# Patient Record
(demographics unavailable — no encounter records)

---

## 2017-01-08 NOTE — PICIS
[facility]

                                EMERGENCY RECORD



TRIAGE (Sun 2017 10:33 AWAT)

TRIAGE NOTES:  SORE THROAT, RUNNY NOSE, AND COUGH X 3

      DAYS..... OH, AND WHEN ALMOST FINISHED WITH TRIAGE, SHE SAID SHE HAS

      ALSO HAD A HEADACHE... (Sun 2017 10:33 AWAT)

PATIENT: NAME: Latisha Acevedo, AGE: 38, GENDER: female, :

      , TIME OF GREET: Sun 2017 10:15, PREFERRED

      LANGUAGE: English, ETHNICITY: Not  or , FALL RISK: NO,

      ECODE BILLING MAP: [facility], SSN: [id number], Zip Code:

      [address], KG WEIGHT: 83.91, PHONE: [phone], MEDICAL RECORD NUMBER:

      [id number], ACCOUNT NUMBER: [id number], PERSON ID: [id number], PCP:

      NONE. (Sun 2017 10:33 AWAT)

COMPLAINT:  SORE THROAT,RUNNY NOSE,COUGH. (Sun 2017 10:33

      AWAT)

ADMISSION: URGENCY: 5 Fast Track, ADMISSION SOURCE: Home,

      TRANSPORT: Walk-in, BED: TRIAGE. (Sun 2017 10:33 AWAT)

PAIN: Location HEAD- DULL HEADACHE. (10:48 AWAT)

SIRS SCORING: Heart Rate  (0), Temp range 96.8-101.1 (0),

      respiratory rate 12-24 (0), Mental Status altered: no (0). (10:48

      AWAT)

PROVIDERS: TRIAGE NURSE: Joshua Wallace RN. (Sun 2017 10:33

      AWAT)

VITAL SIGNS: /95, Pulse 95, Resp 18, Temp 98.8, (Tympanic),

      Pain 5, O2 Sat 96, on Room Air, Time 2017 10:29. (10:29 AWAT)

PREVIOUS VISIT ALLERGIES: HYDROcodone bitartrate (bulk), Sulfa

      (Sulfonamide Antibiotics), traMADol. (Sun 2017 10:33 AWAT)

  HYDROcodone bitartrate (bulk), Sulfa (Sulfonamide Antibiotics), traMADol.

      (10:48 AWAT)



KNOWN ALLERGIES

HYDROcodone bitartrate (bulk): - CAUSES ANXIETY & RAPID HEARTRATE

No Known Drug Allergies (Unconfirmed)

Sulfa (Sulfonamide Antibiotics): Reaction: Rash

traMADol: - CAUSES ANXIETY & RAPIT HEARTRATE



CURRENT MEDICATIONS (10:35 AWAT)

clonazePAM: 

      TABLET : Strength - 0.5 mg : ORAL

      Patient Dose: 0.5 tab(s) Oral 3 times a day.

Proventil HFA: 

      HFA AEROSOL WITH ADAPTER (GRAM) : Strength - 90 mcg : INHALATION

      Patient Dose: 1 puff(s) INHALATION every 4 hours prn.



VITAL SIGNS (10:29 AWAT)

VITAL SIGNS: BP: 132/95, Pulse: 95, Resp: 18, Temp: 98.8

      (Tympanic), Pain: 5, O2 sat: 96 on Room Air, Time: 2017 10:29.



NURSING ASSESSMENT: RESPIRATORY /CHEST (11:01 AWAT)

CONSTITUTIONAL: Simple assessment performed, Patient arrives

      ambulatory, Gait steady, History obtained from patient, Patient

      appears comfortable, Patient cooperative, Patient alert, Oriented to



&a-1R&a+25V*p+0X*z8580U*c202B*c15G*c2P*p-0X&a-25V&a+1R         Name: Latisha Acevedo  : 1978 F38 MedRec: [id number]

                             AcctNum: [id number]

  Prepared: Sun 2017 11:42 by Interface                 Page 1 of 7

                                      pMD

                        [facility]

                                EMERGENCY RECORD



      person, place and time, Skin warm, Skin dry, Skin normal in color,

      Mucous membranes pink, Mucous membranes moist, Patient is

      well-groomed, Patient complains of SORE THROAT, RUNNY NOSE, COUGH,

      DULL HEADACHE.

PAIN: on a scale 0-10 patient rates pain as 5,

      HEADACHE.

RESPIRATORY/CHEST: Breath sounds clear, Respiratory assessment

      findings include respiratory effort easy, Respirations regular,

      Conversing normally, Neck and chest exam findings include trachea

      midline, Chest expansion equal, Chest movement symmetrical,

      Associated with cough, dry, AS REPORTED BY

      PT. NONE WITNESSED DURING THIS VISIT.

ENT: Ear assessment findings include ear normal to inspection,

      Nasal assessment findings include nose normal to inspection, Mouth

      and throat assessment findings include mouth inspection normal.

NOTES: Emotional support needed and given, Patient tolerated

      procedure well.

SAFETY: Side rails up, Cart/Stretcher in lowest position, Family

      at bedside, Call light within reach, Hospital ID band on, Physician

      notified of above findings.



NURSING PROCEDURE: DISCHARGE NOTE (11:25 AWAT)

DISCHARGE: Patient discharged to home, ambulating without

      assistance, driving self, accompanied by /wife/partner,

      Summary of Care printed/ provided, Patient requested and was provided

      an electronic copy of Discharge Instructions, Transition record given

      to patient, Discharge instructions given to patient, Simple or

      moderate discharge teaching performed, by JUWAN RN, Prescriptions

      given and instructions on side effects given, Name of prescription(s)

      given: AMOXIL, PHENERGAN DM, ALBUTEROL INHALER, Above person(s)

      verbalized understanding of discharge instructions and follow-up

      care, Patient discharged by, JUWAN RN, Patient treated and

      evaluated by physician.

BELONGINGS: Belongings remain with patient, Valuables remain with

      patient.

NOTES: Patient tolerated procedure well.

SAFETY: Side rails up, Cart/Stretcher in lowest position, Family

      at bedside, Call light within reach, Hospital ID band on, Physician

      notified of above findings.

VITAL SIGNS: Time: 1125.



MEDICATION ADMINISTRATION SUMMARY



      Drug Name: Tessalon Perles, Dose Ordered: 200 mg, Route: Oral,

      Status: Given, Time: 11:20 2017,

      Drug Name: amoxicillin, Dose Ordered: 500 mg, Route: Oral, Status:

      Given, Time: 11:19 2017, Detailed record available in Medication

      Service section.



MEDICATION SERVICE



&a-1R&a+25V*p+0X*[id number]*c202B*c15G*c2P*p-0X&a-25V&a+1R         Name: Latisha Acevedo  : 1978 F38 MedRec: [id number]

                             AcctNum: [id number]

  Prepared: Sun 2017 11:42 by Interface                 Page 2 of 7

                                      pMD

                        [facility]

                                EMERGENCY RECORD



amoxicillin:  Order: amoxicillin (amoxicillin trihydrate) -

      Dose: 500 mg : Oral

      Schedule: Now

      Ordered by: Ac Chavez MD

      Entered by: MD Adrianne Wyatt 2017 11:07 ,

      Acknowledged by: DREW Alcantar 2017 11:18

      Documented as given by: DREW Alcantar 2017 11:19

      Patient, Medication, Dose, Route and Time verified prior to

      administration.

       Amount given: 500MG, Site: Medication administered P.O., Correct

      patient, time, route, dose and medication confirmed prior to

      administration, Patient advised of actions and side-effects prior to

      administration, Allergies confirmed and medications reviewed prior to

      administration, Administered by JUWAN RN, Patient in position of

      comfort, Side rails up, Cart in lowest position, Family at bedside.

Tessalon Perles:  Order: Tessalon Perles (benzonatate) -

      Dose: 200 mg : Oral

      Schedule: Now

      Ordered by: Ac Chavez MD

      Entered by: MD Adrianne Wyatt 2017 11:07 ,

      Acknowledged by: Joshua Wallace RN Sun 2017 11:18

      Documented as given by: Joshua Wallace RN Sun 2017 11:20

      Patient, Medication, Dose, Route and Time verified prior to

      administration.

       Amount given: 200MG, Site: Medication administered P.O., Correct

      patient, time, route, dose and medication confirmed prior to

      administration, Patient advised of actions and side-effects prior to

      administration, Allergies confirmed and medications reviewed prior to

      administration, Administered by JUWAN RUSSELL, Patient in position of

      comfort, Side rails up, Cart in lowest position, Family at bedside.



HPI COUGH (11:21 LLDO)

CHIEF COMPLAINT: Patient presents for evaluation of

      cough, productive of yellow sputum, Denies barking

      cough, Patient presents for evaluation of see triage note.

      HISTORIAN: History provided by patient, History provided by

      patient's spouse. LOCATION: Symptoms are

      generalized. QUALITY: Symptoms described as

      tightness, Pain is dull in nature, described as

      aching, Described as similar to previous episodes, pt is a

      smoker and has had to use inhalers in the past. out of meds at this

      time. SEVERITY: Maximum severity of symptoms

      moderate, Currently symptoms are moderate. TIME

      COURSE: Sudden onset of symptoms, Symptoms are

      worsening, are constant. ASSOCIATED WITH:

      Associated symptoms reviewed, Associated with fever,

      subjective, Associated with hyperventilation,

      Associated with upper respiratory infection,

      Associated with wheezing, Associated with

      weakness. EXACERBATED BY: Patient's condition



&a-1R&a+25V*p+0X*t7514F*c202B*c15G*c2P*p-0X&a-25V&a+1R         Name: Latisha Acevedo  : 1978 F38 MedRec: [id number]

                             AcctNum: [id number]

  Prepared: Sun 2017 11:42 by Interface                 Page 3 of 7

                                      pMD

                        [facility]

                                EMERGENCY RECORD



      exacerbated by deep breaths, Patient's condition

      exacerbated by exercise, Patient's condition exacerbated by

      lying flat. RELIEVED BY: Patient's condition relieved

      by rest, Patient's condition relieved by upright position.



ROS

CONSTITUTIONAL:  couldn't sleep last night d.t. cough and

      congestion. (11:23 LLDO)

EYES: Negative eye review of systems, Historian denies eye pain,

      denies eye redness, denies eye discharge. (11:27 LLDO)

ENT: Historian reports sinus pain, reports sore

      throat. (11:23 LLDO)

CARDIOVASCULAR: Historian reports dyspnea on exertion.

      (11:23 LLDO)

RESPIRATORY: Historian reports cough, reports

      sputum. described as thick, green,

      yellow, Historian denies stridor, reports

      wheezing. (11:23 LLDO)

GI: Negative gastrointestinal review of systems, Historian denies

      abdominal pain, denies constipation, denies diarrhea, denies nausea,

      denies vomiting. (11:27 LLDO)

GENITOURINARY FEMALE: Negative genitourinary review of systems,

      Historian denies dysuria, denies frequency, denies urgency. (11:27

      LLDO)

MUSCULOSKELETAL: Negative musculoskeletal review of systems,

      Historian denies arthralgias, denies back pain, denies injury, denies

      myalgias, denies neck pain. (11:27 LLDO)

SKIN: Negative skin review of systems, Historian denies

      cellulitis, denies rash, denies skin changes, denies skin lesions.

      (11:27 LLDO)

NEUROLOGIC: Historian denies confusion, denies dizziness, denies

      dysphasia, denies focal weakness, denies gait changes, reports

      headache, denies irritability, denies lethargy, denies mental

      status changes. (11:23 LLDO)

HEMO/LYMPHATIC: Normal hematologic/lymphatic system review,

      Historian denies abnormal blood clotting, denies gum bleeding, denies

      petechiae. (11:27 LLDO)

ALLERGIC/IMMUNOLOGIC: Normal allergy/immunologic system review,

      Historian denies eczema, denies environmental allergies, denies food

      allergies. (11:27 LLDO)

PSYCHIATRIC: Negative psychiatric review of systems, Historian

      denies alcohol abuse, denies anxiety, denies depression, denies drug

      abuse, denies hallucinations. (11:27 LLDO)

NOTES: All systems reviewed, negative except as described above.

      (11:23 LLDO)



PAST MEDICAL HISTORY

MEDICAL HISTORY:  Flu vaccine not up to date, Tetanus not up

      to date, Pneumococcal vaccine not up to date, Past medical history

      includes history of hyperlipidemia, high cholesterol,. (10:48



&a-1R&a+25V*p+0X*s5167C*c202B*c15G*c2P*p-0X&a-25V&a+1R         Name: Latisha Acevedo  : 1978 F38 MedRec: [id number]

                             AcctNum: [id number]

  Prepared: Sun 2017 11:42 by Interface                 Page 4 of 7

                                      pMD

                        [facility]

                                EMERGENCY RECORD



      AWAT)

FEMALE SURGICAL HISTORY:  Surgical history of tubal

      ligation,. (10:48 AWAT)

PSYCHIATRIC HISTORY:  Psychiatric history includes, anxiety,

      No previous psychiatric history, no previous inpatient psychiatric

      admissions, no previous emergency department psychiatric

      evaluations. (10:48 AWAT)

SOCIAL HISTORY:  Patient currently uses tobacco, smokes

      cigarettes, DECREASED SINCE YEARS PAST., Patient denies alcohol use,

      Patient denies drug use, . (10:48 AWAT)

FAMILY HISTORY:  No known family hisotry. (10:48 AWAT)

NOTES: Nursing records reviewed, Agree with nursing records,

      Medication list reviewed. (11:26 LLDO)



PHYSICAL EXAM

CONSTITUTIONAL: Vital Signs Reviewed, Patient afebrile, Pulse

      normal, Blood pressure, BP IS ELEVATED SLIGHTLY,

      Respiratory rate normal, Normal pulse oximetry, Patient

      appears, uncomfortable, Patient appears,

      in moderate pain distress, Patient alert and oriented to

      person, place and time, Nursing notes reviewed. (11:25 LLDO)

HEAD: Head exam normal, Head exam included findings of head

      atraumatic, normocephalic. (11:27 LLDO)

EYES: Eye exam normal, Eye exam included findings of eyelids

      normal to inspection, Pupils equally round and reactive to light,

      Extraocular muscles intact. (11:27 LLDO)

ENT: Ear exam normal, Nose exam normal, Pharynx,

      injected bilaterally, with swelling bilaterally,

      symmetrical, Uvula exam normal, Mouth exam normal, Sinus exam

      included findings of frontal sinuses normal, maxillary sinuses

      normal. (11:25 LLDO)

NECK: Neck exam included findings of normal range of motion,

      Trachea midline, Thyroid normal, no meningeal signs, no cervical

      adenopathy, no tenderness. (11:25 LLDO)

RESPIRATORY CHEST: Respiratory exam included findings of no

      respiratory distress, Wheezing present, Rales

      present, Chest exam included findings of chest movement

      symmetrical, Chest expansion equal, no tenderness, RALES AND WHEEZES

      MILD AND SCATTERED. (11:25 LLDO)

CARDIOVASCULAR: Cardiovascular assessment normal, Cardiovascular

      exam included findings of heart rate regular rate and rhythm, Heart

      sounds normal. (11:27 LLDO)

ABDOMEN FEMALE: Abdominal exam normal, Abdominal exam included

      findings of abdomen nontender, Bowel sounds normal, no peritoneal

      signs. (11:27 LLDO)

BACK: Back exam normal, Back exam included findings of normal

      inspection, range of motion normal. (11:27 LLDO)

UPPER EXTREMITY: Upper extremity exam normal, Upper extremity

      exam included findings of inspection normal, Range of motion normal.

      (11:27 LLDO)



&a-1R&a+25V*p+0X*i7527T*c202B*c15G*c2P*p-0X&a-25V&a+1R         Name: Latisha Acevedo  : 1978 F38 MedRec: [id number]

                             AcctNum: [id number]

  Prepared: Sun 2017 11:42 by Interface                 Page 5 of 7

                                      pMD

                        [facility]

                                EMERGENCY RECORD



LOWER EXTREMITY: Lower extremity exam normal, Lower extremity

      exam included findings of inspection normal, Range of motion normal.

      (11:27 LLDO)

NEURO: Neuro exam normal, Neuro exam findings include patient

      oriented to person, place and time, Speech normal, [place] coma scale

      15. (11:27 LLDO)

SKIN: Skin exam normal, Skin exam included findings of skin warm,

      dry, and normal in color, no rash. (11:27 LLDO)

PSYCHIATRIC: Psychiatric exam normal, Psychiatric exam included

      findings of patient oriented to person place and time, Normal affect.

      (11:27 LLDO)



EVENTS

TRANSFER:  Triage to Emergency Triage. (Sun 2017 10:33

      AWAT)

   Emergency Triage to Main ED -04. (10:35 AWAT)

   Removed from Emergency Main ED -04. (11:34 AWAT)



PROBLEM LIST

  No recorded problems



DIAGNOSIS (11:07 LLDO)

FINAL: PRIMARY: Acute bronchitis.



DISPOSITION

PATIENT:  Disposition Type: Discharge, Disposition: *Discharge

      Home. (11:07 LLDO)

   Patient left the department. (11:34 AWAT)



INSTRUCTION (11:10 LLDO)

DISCHARGE:  BRONCHITIS, ABX TX (ADULT).

FOLLOWUP: Follow up with Primary Care Physician in 7-10 days.

SPECIAL:  Follow-up with your PCP.



PRESCRIPTION (11:09 LLDO)

albuterol sulfate inhalation:  HFA AEROSOL WITH ADAPTER (GRAM) :

      90 mcg : INHALATION : Quantity: *** 1-2 *** Unit: puff(s) Route:

      INHALATION Schedule: every 4 hours prn Dispense: *** 1 ***

      May substitute. Refills: *** 3 ***.

amoxicillin:  CAPSULE (HARD, SOFT, ETC.) : 500 mg : ORAL :

      Quantity: *** 1 *** Unit: cap(s) Route: ORAL Schedule: 3 times a day

      Dispense: *** 30 ***

      May substitute. Refills: *** No Refills ***.

Phenergan DM:  SYRUP : : ORAL : Quantity: *** 1-2 *** Unit:

      teaspoon Route: ORAL Schedule: every 4 hours prn Dispense: *** 180

      *** Unit: mL

      May substitute. Refills: *** No Refills ***.



IMAGING (11:27 AWAT)

*DISCHARGE INSTRUCTIONS RECEIPT:  Image captured from scanner.



&a-1R&a+25V*p+0X*z2361G*c202B*c15G*c2P*p-0X&a-25V&a+1R         Name: Latisha Acevedo  : 1978 F38 MedRec: [id number]

                             AcctNum: [id number]

  Prepared: Sun 2017 11:42 by Interface                 Page 6 of 7

                                      pMD

                        [facility]

                                EMERGENCY RECORD



*SUPPLY CHARGE SHEET:  Image captured from scanner.



ADMIN (11:27 LLDO)

DIGITAL SIGNATURE:  MD Chavez Lloyd.

[place]:

  AWAT=DREW Wallace, Joshua  LLDO=MD Chavez Lloyd



























































































&a-1R&a+25V*p+0X*s0127Y*c202B*c15G*c2P*p-0X&a-25V&a+1R         Name: Latisha Acevedo  : 1978 F38 MedRec: [id number]

                             AcctNum: [id number]

  Prepared: Sun 2017 11:42 by Interface                 Page 7 of 7

                                      pMD





                     [facility]

                        MEDICATION RECONCILIATION



    You were seen in the Emergency Department on: Sun 2017

    

    KNOWN ALLERGIES

         HYDROcodone bitartrate (bulk): - CAUSES ANXIETY & RAPID

         HEARTRATE

         No Known Drug Allergies (Unconfirmed)

         Sulfa (Sulfonamide Antibiotics): Reaction: Rash

         traMADol: - CAUSES ANXIETY & RAPIT HEARTRATE

    

    MEDICATIONS GIVEN WHILE IN THE EMERGENCY DEPARTMENT

    amoxicillin (amoxicillin trihydrate) - Dose: 500 milligram(s) : Oral

    Tessalon Perles (benzonatate) - Dose: 200 milligram(s) : Oral

    

    HOME MEDICATIONS 

    

      CONTINUE AS PRESCRIBED

      clonazePAM : TABLET : Strength - 0.5 mg : ORAL

        Continue as prescribed

        Patient had been takin.5 tab(s) Oral 3 times a day.

    

      Proventil HFA : HFA AEROSOL WITH ADAPTER (GRAM) : Strength - 90

      mcg : INHALATION

        Continue as prescribed

        Patient had been takin puff(s) INHALATION every 4 hours prn.

    

    Notes from the emergency department

      Reviewed with family

      Reviewed with patient

    

    PRESCRIPTIONS (3)

    

    Printed (3)

    

      albuterol sulfate inhalation : HFA AEROSOL WITH ADAPTER (GRAM) :

      90 mcg : INHALATION

          Quantity: 1-2, Unit: puff(s), Route: INHALATION, Schedule:

          every 4 hours prn, Dispense: 1

    

      amoxicillin : CAPSULE (HARD, SOFT, ETC.) : 500 mg : ORAL

          Quantity: 1, Unit: cap(s), Route: ORAL, Schedule: 3 times a

          day, Dispense: 30

    











&a-1R&a+25V*p+0X*j3529D*c202B*c15G*c2P*p-0X&a-25V&a+1R      Name: Latisha Acevedo  : 1978 F38 MedRec: [id number]

                          AcctNum: [id number]

               Prepared: Sun 2017 11:42 by Interface

                                   pMD

[facility]

## 2017-01-08 NOTE — ERRECORD
[facility]

                                EMERGENCY RECORD



HPI COUGH (11:21 LLDO)

CHIEF COMPLAINT: Patient presents for evaluation of

      cough, productive of yellow sputum, Denies barking

      cough, Patient presents for evaluation of see triage note.

      HISTORIAN: History provided by patient, History provided by

      patient's spouse. LOCATION: Symptoms are

      generalized. QUALITY: Symptoms described as

      tightness, Pain is dull in nature, described as

      aching, Described as similar to previous episodes, pt is a

      smoker and has had to use inhalers in the past. out of meds at this

      time. SEVERITY: Maximum severity of symptoms

      moderate, Currently symptoms are moderate. TIME

      COURSE: Sudden onset of symptoms, Symptoms are

      worsening, are constant. ASSOCIATED WITH:

      Associated symptoms reviewed, Associated with fever,

      subjective, Associated with hyperventilation,

      Associated with upper respiratory infection,

      Associated with wheezing, Associated with

      weakness. EXACERBATED BY: Patient's condition

      exacerbated by deep breaths, Patient's condition

      exacerbated by exercise, Patient's condition exacerbated by

      lying flat. RELIEVED BY: Patient's condition relieved

      by rest, Patient's condition relieved by upright position.



ROS

CONSTITUTIONAL:  couldn't sleep last night d.t. cough and

      congestion. (11:23 LLDO)

EYES: Negative eye review of systems, Historian denies eye pain,

      denies eye redness, denies eye discharge. (11:27 LLDO)

ENT: Historian reports sinus pain, reports sore

      throat. (11:23 LLDO)

CARDIOVASCULAR: Historian reports dyspnea on exertion.

      (11:23 LLDO)

RESPIRATORY: Historian reports cough, reports

      sputum. described as thick, green,

      yellow, Historian denies stridor, reports

      wheezing. (11:23 LLDO)

GI: Negative gastrointestinal review of systems, Historian denies

      abdominal pain, denies constipation, denies diarrhea, denies nausea,

      denies vomiting. (11:27 LLDO)

GENITOURINARY FEMALE: Negative genitourinary review of systems,

      Historian denies dysuria, denies frequency, denies urgency. (11:27

      LLDO)

MUSCULOSKELETAL: Negative musculoskeletal review of systems,

      Historian denies arthralgias, denies back pain, denies injury, denies

      myalgias, denies neck pain. (11:27 LLDO)

SKIN: Negative skin review of systems, Historian denies

      cellulitis, denies rash, denies skin changes, denies skin lesions.

      (11:27 LLDO)

NEUROLOGIC: Historian denies confusion, denies dizziness, denies



&a-1R&a+25V*p+0X*u6995I*c202B*c15G*c2P*p-0X&a-25V&a+1R         Name: Latisha Acevedo  : 1978 F38 MedRec: [id number]

                             AcctNum: [id number]

  Prepared: Sun 2017 11:37 by Interface                 Page 1 of 4

                                      pMD

                        [facility]

                                EMERGENCY RECORD



      dysphasia, denies focal weakness, denies gait changes, reports

      headache, denies irritability, denies lethargy, denies mental

      status changes. (11:23 LLDO)

HEMO/LYMPHATIC: Normal hematologic/lymphatic system review,

      Historian denies abnormal blood clotting, denies gum bleeding, denies

      petechiae. (11:27 LLDO)

ALLERGIC/IMMUNOLOGIC: Normal allergy/immunologic system review,

      Historian denies eczema, denies environmental allergies, denies food

      allergies. (11:27 LLDO)

PSYCHIATRIC: Negative psychiatric review of systems, Historian

      denies alcohol abuse, denies anxiety, denies depression, denies drug

      abuse, denies hallucinations. (11:27 LLDO)

NOTES: All systems reviewed, negative except as described above.

      (11:23 LLDO)



PAST MEDICAL HISTORY

MEDICAL HISTORY:  Flu vaccine not up to date, Tetanus not up

      to date, Pneumococcal vaccine not up to date, Past medical history

      includes history of hyperlipidemia, high cholesterol,. (10:48

      AWAT)

FEMALE SURGICAL HISTORY:  Surgical history of tubal

      ligation,. (10:48 AWAT)

PSYCHIATRIC HISTORY:  Psychiatric history includes, anxiety,

      No previous psychiatric history, no previous inpatient psychiatric

      admissions, no previous emergency department psychiatric

      evaluations. (10:48 AWAT)

SOCIAL HISTORY:  Patient currently uses tobacco, smokes

      cigarettes, DECREASED SINCE YEARS PAST., Patient denies alcohol use,

      Patient denies drug use, . (10:48 AWAT)

FAMILY HISTORY:  No known family hisotry. (10:48 AWAT)

NOTES: Nursing records reviewed, Agree with nursing records,

      Medication list reviewed. (11:26 LLDO)



KNOWN ALLERGIES

HYDROcodone bitartrate (bulk): - CAUSES ANXIETY & RAPID HEARTRATE

No Known Drug Allergies (Unconfirmed)

Sulfa (Sulfonamide Antibiotics): Reaction: Rash

traMADol: - CAUSES ANXIETY & RAPIT HEARTRATE



CURRENT MEDICATIONS (10:35 AWAT)

clonazePAM: 

      TABLET : Strength - 0.5 mg : ORAL

      Patient Dose: 0.5 tab(s) Oral 3 times a day.

Proventil HFA: 

      HFA AEROSOL WITH ADAPTER (GRAM) : Strength - 90 mcg : INHALATION

      Patient Dose: 1 puff(s) INHALATION every 4 hours prn.



VITAL SIGNS (10:29 AWAT)

VITAL SIGNS: BP: 132/95, Pulse: 95, Resp: 18, Temp: 98.8

      (Tympanic), Pain: 5, O2 sat: 96 on Room Air, Time: 2017 10:29.



&a-1R&a+25V*p+0X*p9949G*c202B*c15G*c2P*p-0X&a-25V&a+1R         Name: Latisha Acevedo  : 1978 F38 MedRec: [id number]

                             AcctNum: [id number]

  Prepared: Sun 2017 11:37 by Interface                 Page 2 of 4

                                      pMD

                        [facility]

                                EMERGENCY RECORD





PHYSICAL EXAM

CONSTITUTIONAL: Vital Signs Reviewed, Patient afebrile, Pulse

      normal, Blood pressure, BP IS ELEVATED SLIGHTLY,

      Respiratory rate normal, Normal pulse oximetry, Patient

      appears, uncomfortable, Patient appears,

      in moderate pain distress, Patient alert and oriented to

      person, place and time, Nursing notes reviewed. (11:25 LLDO)

HEAD: Head exam normal, Head exam included findings of head

      atraumatic, normocephalic. (11:27 LLDO)

EYES: Eye exam normal, Eye exam included findings of eyelids

      normal to inspection, Pupils equally round and reactive to light,

      Extraocular muscles intact. (11:27 LLDO)

ENT: Ear exam normal, Nose exam normal, Pharynx,

      injected bilaterally, with swelling bilaterally,

      symmetrical, Uvula exam normal, Mouth exam normal, Sinus exam

      included findings of frontal sinuses normal, maxillary sinuses

      normal. (11:25 LLDO)

NECK: Neck exam included findings of normal range of motion,

      Trachea midline, Thyroid normal, no meningeal signs, no cervical

      adenopathy, no tenderness. (11:25 LLDO)

RESPIRATORY CHEST: Respiratory exam included findings of no

      respiratory distress, Wheezing present, Rales

      present, Chest exam included findings of chest movement

      symmetrical, Chest expansion equal, no tenderness, RALES AND WHEEZES

      MILD AND SCATTERED. (11:25 LLDO)

CARDIOVASCULAR: Cardiovascular assessment normal, Cardiovascular

      exam included findings of heart rate regular rate and rhythm, Heart

      sounds normal. (11:27 LLDO)

ABDOMEN FEMALE: Abdominal exam normal, Abdominal exam included

      findings of abdomen nontender, Bowel sounds normal, no peritoneal

      signs. (11:27 LLDO)

BACK: Back exam normal, Back exam included findings of normal

      inspection, range of motion normal. (11:27 LLDO)

UPPER EXTREMITY: Upper extremity exam normal, Upper extremity

      exam included findings of inspection normal, Range of motion normal.

      (11:27 LLDO)

LOWER EXTREMITY: Lower extremity exam normal, Lower extremity

      exam included findings of inspection normal, Range of motion normal.

      (11:27 LLDO)

NEURO: Neuro exam normal, Neuro exam findings include patient

      oriented to person, place and time, Speech normal, Jaimie coma scale

      15. (11:27 LLDO)

SKIN: Skin exam normal, Skin exam included findings of skin warm,

      dry, and normal in color, no rash. (11:27 LLDO)

PSYCHIATRIC: Psychiatric exam normal, Psychiatric exam included

      findings of patient oriented to person place and time, Normal affect.

      (11:27 LLDO)



MEDICATION ADMINISTRATION SUMMARY



&a-1R&a+25V*p+0X*o2817R*c202B*c15G*c2P*p-0X&a-25V&a+1R         Name: Latisha Acevedo  : 1978 F38 MedRec: [id number]

                             AcctNum: [id number]

  Prepared: Sun 2017 11:37 by Interface                 Page 3 of 4

                                      pMD

                        [facility]

                                EMERGENCY RECORD





      Drug Name: Tessalon Perles, Dose Ordered: 200 mg, Route: Oral,

      Status: Given, Time: 11:20 2017,

      Drug Name: amoxicillin, Dose Ordered: 500 mg, Route: Oral, Status:

      Given, Time: 11:19 2017, Detailed record available in Medication

      Service section.



PROBLEM LIST

  No recorded problems



DIAGNOSIS (11:07 LLDO)

FINAL: PRIMARY: Acute bronchitis.



PRESCRIPTION (11:09 LLDO)

albuterol sulfate inhalation:  HFA AEROSOL WITH ADAPTER (GRAM) :

      90 mcg : INHALATION : Quantity: *** 1-2 *** Unit: puff(s) Route:

      INHALATION Schedule: every 4 hours prn Dispense: *** 1 ***

      May substitute. Refills: *** 3 ***.

amoxicillin:  CAPSULE (HARD, SOFT, ETC.) : 500 mg : ORAL :

      Quantity: *** 1 *** Unit: cap(s) Route: ORAL Schedule: 3 times a day

      Dispense: *** 30 ***

      May substitute. Refills: *** No Refills ***.

Phenergan DM:  SYRUP : : ORAL : Quantity: *** 1-2 *** Unit:

      teaspoon Route: ORAL Schedule: every 4 hours prn Dispense: *** 180

      *** Unit: mL

      May substitute. Refills: *** No Refills ***.



DISPOSITION

PATIENT:  Disposition Type: Discharge, Disposition: *Discharge

      Home. (11:07 KATARINA)

   Patient left the department. (11:34 JIM)

[place]:

  AWAT=Rodrigo RN, Joshua  LLDO=MD Scott, Ac





































&a-1R&a+25V*p+0X*c9872B*c202B*c15G*c2P*p-0X&a-25V&a+1R         Name: Latisha Acevedo SVETA  : 1978 F38 MedRec: [id number]

                             AcctNum: [id number]

  Prepared: Sun 2017 11:37 by Interface                 Page 4 of 4

                                      pMD

MTDD

## 2017-04-08 NOTE — CT
CT ABDOMEN AND PELVIS WITH IV CONTRAST:

4/8/17

 

HISTORY: 

Epigastric pain.

 

FINDINGS:  

Comparison is made with exam of 5/8/15. 

 

The lung bases are clear. The liver, spleen, pancreas, adrenal glands and right kidney are normal. A
 nonobstructing left renal calculus is present. Tiny low density lesions in the left kidney are like
ly cysts. There is a calculus in the neck of the gallbladder with gallbladder wall thickening. 

 

No free air, free fluid, or lymphadenopathy seen in the abdomen or pelvis. The appendix is normal. T
here is colonic diverticulosis without evidence of diverticulitis. Uterus and right ovary are presen
t. There is a 9.5 x 7 x 6.5 cm pelvic mass associated with the left adnexa containing fat, calcium, 
soft tissue density consistent with a left ovarian dermoid tumor which was also seen on the previous
 study. 

 

IMPRESSION:  

1.      Probable cholelithiasis and gallbladder wall thickening. Further evaluation with gallbladder
 ultrasound would be helpful. 

2.      Colonic diverticulosis. 

3.      Nonobstructing left renal calculus. 

4.      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 

 

POS: [facility]

## 2017-04-08 NOTE — RAD
AP VIEW OF THE CHEST

4/8/17

 

INDICATION:

Severe epigastric pain.

 

COMPARISON:  

Prior exam dated 2/20/16.

 

IMPRESSION:  

No acute cardiopulmonary abnormality. No appreciable change is seen from the comparison study. 

 

POS: [facility]